# Patient Record
Sex: MALE | Race: WHITE | Employment: FULL TIME | URBAN - METROPOLITAN AREA
[De-identification: names, ages, dates, MRNs, and addresses within clinical notes are randomized per-mention and may not be internally consistent; named-entity substitution may affect disease eponyms.]

---

## 2020-04-26 ENCOUNTER — APPOINTMENT (OUTPATIENT)
Dept: GENERAL RADIOLOGY | Facility: CLINIC | Age: 65
End: 2020-04-26
Attending: EMERGENCY MEDICINE
Payer: COMMERCIAL

## 2020-04-26 ENCOUNTER — HOSPITAL ENCOUNTER (EMERGENCY)
Facility: CLINIC | Age: 65
Discharge: HOME | End: 2020-04-26
Attending: EMERGENCY MEDICINE | Admitting: EMERGENCY MEDICINE
Payer: COMMERCIAL

## 2020-04-26 VITALS
HEART RATE: 82 BPM | SYSTOLIC BLOOD PRESSURE: 118 MMHG | RESPIRATION RATE: 23 BRPM | WEIGHT: 195 LBS | DIASTOLIC BLOOD PRESSURE: 85 MMHG | TEMPERATURE: 97.9 F | OXYGEN SATURATION: 97 % | HEIGHT: 69 IN | BODY MASS INDEX: 28.88 KG/M2

## 2020-04-26 DIAGNOSIS — R53.1 WEAKNESS: ICD-10-CM

## 2020-04-26 DIAGNOSIS — R09.89 CHEST CONGESTION: ICD-10-CM

## 2020-04-26 LAB
ANION GAP SERPL CALCULATED.3IONS-SCNC: 3 MMOL/L (ref 3–14)
BASOPHILS # BLD AUTO: 0.1 10E9/L (ref 0–0.2)
BASOPHILS NFR BLD AUTO: 0.9 %
BUN SERPL-MCNC: 17 MG/DL (ref 7–30)
CALCIUM SERPL-MCNC: 9.1 MG/DL (ref 8.5–10.1)
CHLORIDE SERPL-SCNC: 109 MMOL/L (ref 94–109)
CO2 SERPL-SCNC: 30 MMOL/L (ref 20–32)
CREAT SERPL-MCNC: 1.03 MG/DL (ref 0.66–1.25)
D DIMER PPP FEU-MCNC: 0.3 UG/ML FEU (ref 0–0.5)
DIFFERENTIAL METHOD BLD: ABNORMAL
EOSINOPHIL # BLD AUTO: 0.8 10E9/L (ref 0–0.7)
EOSINOPHIL NFR BLD AUTO: 15.4 %
ERYTHROCYTE [DISTWIDTH] IN BLOOD BY AUTOMATED COUNT: 12.6 % (ref 10–15)
GFR SERPL CREATININE-BSD FRML MDRD: 77 ML/MIN/{1.73_M2}
GLUCOSE SERPL-MCNC: 93 MG/DL (ref 70–99)
HCT VFR BLD AUTO: 45.1 % (ref 40–53)
HGB BLD-MCNC: 16 G/DL (ref 13.3–17.7)
IMM GRANULOCYTES # BLD: 0 10E9/L (ref 0–0.4)
IMM GRANULOCYTES NFR BLD: 0 %
INTERPRETATION ECG - MUSE: NORMAL
LYMPHOCYTES # BLD AUTO: 1 10E9/L (ref 0.8–5.3)
LYMPHOCYTES NFR BLD AUTO: 18.3 %
MCH RBC QN AUTO: 33.1 PG (ref 26.5–33)
MCHC RBC AUTO-ENTMCNC: 35.5 G/DL (ref 31.5–36.5)
MCV RBC AUTO: 93 FL (ref 78–100)
MONOCYTES # BLD AUTO: 0.4 10E9/L (ref 0–1.3)
MONOCYTES NFR BLD AUTO: 7.1 %
NEUTROPHILS # BLD AUTO: 3.2 10E9/L (ref 1.6–8.3)
NEUTROPHILS NFR BLD AUTO: 58.3 %
NRBC # BLD AUTO: 0 10*3/UL
NRBC BLD AUTO-RTO: 0 /100
PLATELET # BLD AUTO: 230 10E9/L (ref 150–450)
POTASSIUM SERPL-SCNC: 4 MMOL/L (ref 3.4–5.3)
RBC # BLD AUTO: 4.84 10E12/L (ref 4.4–5.9)
SODIUM SERPL-SCNC: 142 MMOL/L (ref 133–144)
TROPONIN I SERPL-MCNC: <0.015 UG/L (ref 0–0.04)
WBC # BLD AUTO: 5.5 10E9/L (ref 4–11)

## 2020-04-26 PROCEDURE — 84484 ASSAY OF TROPONIN QUANT: CPT | Performed by: EMERGENCY MEDICINE

## 2020-04-26 PROCEDURE — 71045 X-RAY EXAM CHEST 1 VIEW: CPT

## 2020-04-26 PROCEDURE — 99285 EMERGENCY DEPT VISIT HI MDM: CPT

## 2020-04-26 PROCEDURE — 80048 BASIC METABOLIC PNL TOTAL CA: CPT | Performed by: EMERGENCY MEDICINE

## 2020-04-26 PROCEDURE — 85025 COMPLETE CBC W/AUTO DIFF WBC: CPT | Performed by: EMERGENCY MEDICINE

## 2020-04-26 PROCEDURE — 93005 ELECTROCARDIOGRAM TRACING: CPT

## 2020-04-26 PROCEDURE — 85379 FIBRIN DEGRADATION QUANT: CPT | Performed by: EMERGENCY MEDICINE

## 2020-04-26 RX ORDER — ALBUTEROL SULFATE 90 UG/1
2 AEROSOL, METERED RESPIRATORY (INHALATION) EVERY 4 HOURS PRN
Qty: 1 INHALER | Refills: 0 | Status: SHIPPED | OUTPATIENT
Start: 2020-04-26

## 2020-04-26 RX ORDER — GEMFIBROZIL 600 MG/1
600 TABLET, FILM COATED ORAL
COMMUNITY

## 2020-04-26 RX ORDER — LISINOPRIL 40 MG/1
20 TABLET ORAL DAILY
COMMUNITY

## 2020-04-26 RX ORDER — TAMSULOSIN HYDROCHLORIDE 0.4 MG/1
0.4 CAPSULE ORAL 2 TIMES DAILY
COMMUNITY

## 2020-04-26 RX ORDER — NIACIN 500 MG
TABLET ORAL
COMMUNITY

## 2020-04-26 ASSESSMENT — MIFFLIN-ST. JEOR: SCORE: 1661.95

## 2020-04-26 NOTE — LETTER
April 26, 2020      To Whom It May Concern:      Rickey Alvarado was seen in our Emergency Department today, 04/26/20. He is clear to return to his normal activities without restrictions.    Sincerely,        Jacob Fine MD

## 2020-04-26 NOTE — ED PROVIDER NOTES
History     Chief Complaint:  Chest Pain      HPI   Rickey Alvarado is a 65 year old male who presents with chest pain.  He states that he has been feeling ill with weakness and chest congestion since about February 29, with symptoms waxing and waning.  Tobacco denies any chronic lung conditions.  He reports an occasional cough.  He was evaluated in [facility] on April 18, and was prescribed a Z-Raheel which he took, and felt perhaps lightly better during that time, before symptoms became worse the last few days.  No history of heart conditions.  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  He has not tried any medications beyond the azithromycin for this.  He denies any known sick contacts.  No leg swelling or orthopnea.  No history of DVT or PE.    Allergies:  No known drug allergies     Medications:    Gemfibrozil   Lisinopril   Niacin    Past Medical History:    The patient does not have any past pertinent medical history.     Past Surgical History:    The patient does not have any pertinent past surgical history.     Family History:    No past pertinent family history.     Social History:  Lives in [place]    Review of Systems   All other systems reviewed and are negative.    Physical Exam     Patient Vitals for the past 24 hrs:   BP Temp Temp src Pulse Heart Rate Resp SpO2 Height Weight   04/26/20 1935 -- -- -- -- 87 23 -- -- --   04/26/20 1930 118/85 -- -- 82 86 15 97 % -- --   04/26/20 1925 (!) 118/92 -- -- 76 85 24 95 % -- --   04/26/20 1922 -- -- -- -- 84 19 97 % -- --   04/26/20 1921 -- -- -- -- 80 10 97 % -- --   04/26/20 1920 -- -- -- -- 85 12 96 % -- --   04/26/20 1919 -- -- -- -- 83 23 100 % -- --   04/26/20 1918 -- -- -- -- 80 8 99 % -- --   04/26/20 1917 -- -- -- -- 86 14 100 % -- --   04/26/20 1916 -- -- -- -- 76 (!) 7 93 % -- --   04/26/20 1915 -- -- -- -- 81 11 99 % -- --   04/26/20 1914 -- -- -- -- 75 9 97 % -- --   04/26/20 1913 -- -- -- -- 81 10 99 % -- --   04/26/20 1912  -- -- -- -- 79 10 98 % -- --   04/26/20 1911 -- -- -- -- 90 11 98 % -- --   04/26/20 1910 -- -- -- -- 85 13 96 % -- --   04/26/20 1909 -- -- -- -- 81 9 99 % -- --   04/26/20 1908 -- -- -- -- 84 12 97 % -- --   04/26/20 1907 -- -- -- -- 85 11 99 % -- --   04/26/20 1906 -- -- -- -- 81 11 99 % -- --   04/26/20 1905 -- -- -- -- 75 11 99 % -- --   04/26/20 1904 -- -- -- -- 84 12 99 % -- --   04/26/20 1903 -- -- -- -- 82 12 98 % -- --   04/26/20 1902 -- -- -- -- 83 8 100 % -- --   04/26/20 1901 -- -- -- -- 77 12 98 % -- --   04/26/20 1900 112/85 -- -- 84 81 12 99 % -- --   04/26/20 1859 -- -- -- -- 80 11 99 % -- --   04/26/20 1858 -- -- -- -- 77 12 97 % -- --   04/26/20 1857 -- -- -- -- 80 10 98 % -- --   04/26/20 1856 -- -- -- -- 83 11 98 % -- --   04/26/20 1855 -- -- -- -- 86 11 98 % -- --   04/26/20 1854 -- -- -- -- 86 11 98 % -- --   04/26/20 1853 -- -- -- -- 92 12 96 % -- --   04/26/20 1852 -- -- -- -- 90 16 96 % -- --   04/26/20 1851 -- -- -- -- 93 17 97 % -- --   04/26/20 1850 -- -- -- -- 85 12 99 % -- --   04/26/20 1849 -- -- -- -- 90 14 97 % -- --   04/26/20 1848 -- -- -- -- 87 16 96 % -- --   04/26/20 1847 -- -- -- -- 90 16 97 % -- --   04/26/20 1846 -- -- -- -- 87 15 98 % -- --   04/26/20 1845 -- -- -- -- 81 10 98 % -- --   04/26/20 1844 -- -- -- -- 80 9 99 % -- --   04/26/20 1843 -- -- -- -- 85 11 98 % -- --   04/26/20 1842 -- -- -- -- 80 9 97 % -- --   04/26/20 1841 -- -- -- -- 84 15 96 % -- --   04/26/20 1840 -- -- -- -- 80 17 95 % -- --   04/26/20 1839 -- -- -- -- 86 (!) 36 95 % -- --   04/26/20 1838 -- -- -- -- 91 9 97 % -- --   04/26/20 1837 -- -- -- -- 92 16 96 % -- --   04/26/20 1836 -- -- -- -- 80 9 97 % -- --   04/26/20 1835 -- -- -- -- 81 (!) 0 95 % -- --   04/26/20 1834 -- -- -- -- 78 13 96 % -- --   04/26/20 1833 -- -- -- -- 77 12 96 % -- --   04/26/20 1832 -- -- -- -- 78 9 96 % -- --   04/26/20 1831 -- -- -- -- 71 10 96 % -- --   04/26/20 1830 103/70 -- -- 71 82 11 95 % -- --   04/26/20 1829 -- --  -- -- 78 11 95 % -- --   04/26/20 1828 -- -- -- -- 78 11 95 % -- --   04/26/20 1827 -- -- -- -- 77 12 95 % -- --   04/26/20 1826 -- -- -- -- 79 10 97 % -- --   04/26/20 1825 -- -- -- -- 77 10 96 % -- --   04/26/20 1824 -- -- -- -- 77 15 96 % -- --   04/26/20 1823 -- -- -- -- 85 13 97 % -- --   04/26/20 1822 -- -- -- -- 90 13 96 % -- --   04/26/20 1821 -- -- -- -- 72 8 97 % -- --   04/26/20 1820 -- -- -- -- 81 11 98 % -- --   04/26/20 1819 -- -- -- -- -- -- 98 % -- --   04/26/20 1818 -- -- -- -- -- -- 98 % -- --   04/26/20 1817 -- -- -- -- -- -- 97 % -- --   04/26/20 1816 -- -- -- -- -- -- 96 % -- --   04/26/20 1815 -- -- -- -- -- -- 98 % -- --   04/26/20 1814 -- -- -- -- -- -- 99 % -- --   04/26/20 1813 -- -- -- -- -- -- 98 % -- --   04/26/20 1812 -- -- -- -- -- -- 97 % -- --   04/26/20 1811 -- -- -- -- -- -- 96 % -- --   04/26/20 1810 -- -- -- -- -- -- 96 % -- --   04/26/20 1809 -- -- -- -- -- -- 97 % -- --   04/26/20 1808 -- -- -- -- -- -- 97 % -- --   04/26/20 1807 -- -- -- -- -- -- 97 % -- --   04/26/20 1806 -- -- -- -- -- -- 96 % -- --   04/26/20 1805 -- -- -- -- -- -- 96 % -- --   04/26/20 1804 -- -- -- -- -- -- 96 % -- --   04/26/20 1803 -- -- -- -- -- -- 98 % -- --   04/26/20 1802 -- -- -- -- -- -- 98 % -- --   04/26/20 1801 -- -- -- -- -- -- 97 % -- --   04/26/20 1800 110/82 -- -- 84 -- -- 97 % -- --   04/26/20 1759 -- -- -- -- -- -- 98 % -- --   04/26/20 1758 -- -- -- -- -- -- 98 % -- --   04/26/20 1757 -- -- -- -- -- -- 98 % -- --   04/26/20 1756 -- -- -- -- -- -- 98 % -- --   04/26/20 1755 -- -- -- -- -- -- 99 % -- --   04/26/20 1754 -- -- -- -- -- -- 99 % -- --   04/26/20 1753 -- -- -- -- -- -- 98 % -- --   04/26/20 1752 -- -- -- -- -- -- 98 % -- --   04/26/20 1751 -- -- -- -- -- -- 99 % -- --   04/26/20 1750 -- -- -- -- -- -- 99 % -- --   04/26/20 1749 -- -- -- -- -- -- 99 % -- --   04/26/20 1748 -- -- -- -- -- -- 98 % -- --   04/26/20 1747 -- -- -- -- -- -- 98 % -- --   04/26/20 1746 -- -- -- -- -- --  "99 % -- --   04/26/20 1745 -- -- -- -- -- -- 98 % -- --   04/26/20 1744 -- -- -- -- -- -- 98 % -- --   04/26/20 1743 -- -- -- -- -- -- 100 % -- --   04/26/20 1742 -- -- -- -- -- -- 99 % -- --   04/26/20 1741 -- -- -- -- -- -- 99 % -- --   04/26/20 1740 -- -- -- -- -- -- 99 % -- --   04/26/20 1739 -- -- -- -- -- -- 99 % -- --   04/26/20 1738 -- -- -- -- -- -- 99 % -- --   04/26/20 1737 -- -- -- -- -- -- 100 % -- --   04/26/20 1736 -- -- -- -- -- -- 99 % -- --   04/26/20 1735 -- -- -- -- -- -- 99 % -- --   04/26/20 1734 -- -- -- -- -- -- 100 % -- --   04/26/20 1733 125/85 97.9  F (36.6  C) Oral -- 80 16 100 % 1.74 m (5' 8.5\") 88.5 kg (195 lb)   04/26/20 1732 -- -- -- -- -- -- 99 % -- --   04/26/20 1730 125/85 -- -- 82 -- -- 100 % -- --   04/26/20 1728 (!) 131/95 -- -- 87 -- -- -- -- --   04/26/20 1726 -- -- -- -- -- -- 100 % -- --     Physical Exam   General: Nontoxic-appearing male sitting upright in room 3  HEENT: no major facial deformity  CV: rate as above, appears well perfused, no LE edema  Resp: normal effort, speaks in full phrases, no cough observed  GI: abdomen not protuberant  MSK: ambulatory  Neuro: clear speech, oriented  Psych: cooperative    Emergency Department Course     ECG:  ECG taken at 1813, ECG read at 1818  Normal sinus rhythm  Inferior infarct, age undetermined  No prior EKG  Rate 89 bpm. MO interval 172 ms. QRS duration 88 ms. QT/QTc 350/425 ms. P-R-T axes 26 22 0.    Imaging:  Radiology findings were communicated with the patient who voiced understanding of the findings.    Chest  XR PORT:  No acute cardiopulmonary disease.       Reading per radiology     Laboratory:  Laboratory findings were communicated with the patient who voiced understanding of the findings.    CBC: WBC 5.5, HGB 16.0,   BMP:  o/w WNL (Creatinine 1.03)  Troponin (Collected 1806): <0.015   D Dimer: 0.3    Procedures    Emergency Department Course:   Nursing notes and vitals reviewed.    1735 I performed an exam of " the patient as documented above.     1743 IV was inserted and blood was drawn for laboratory testing, results above.     I performed electronic chart review in [facility].  The patient was placed on continuous cardiac and pulse ox monitoring.    1834 The patient had a Chest PORT XR while in the emergency department, results above.       1916 I personally reviewed the results with the patient and answered all related questions prior to discharge.    Impression & Plan      Medical Decision Making:  The cause of his presenting symptoms is unconfirmed despite testing as above.  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  Infection clearly considered, and not ruled out, though in the absence of infiltrate on chest x-ray I do not think that additional antibiotics are currently indicated.  His work of breathing is normal.  No evidence of acute coronary syndrome with a negative troponin.  No signs of arrhythmia.  Diagnostic uncertainty was acknowledged to the patient and I recommended outpatient follow-up, returning to care promptly unexpected event of sudden worsening.  He was discharged home after all questions answered to his satisfaction.  Work note also provided at his request, stating that he can proceed with his usual activities, as I do not find any medical indication why this should be restricted at this time.    Diagnosis:    ICD-10-CM    1. Chest congestion  R09.89    2. Weakness  R53.1        Disposition:   The patient is discharged to home.     Scribe Disclosure:  I, Stella Alvarado, am serving as a scribe at 1735 on 4/26/2020 to document services personally performed by Messi Fine MD based on my observations and the provider's statements to me.   EMERGENCY DEPARTMENT    This record was created at least in part using  electronic voice recognition software, so please excuse any typographical errors.    Due to COVID-19 pandemic, patient evaluation may have been performed at least in part using communications technology such as phone or video.    Covid-19  Rickey Alvarado was evaluated during a global COVID-19 pandemic, which necessitated consideration that the patient might be at risk for infection with the SARS-CoV-2 virus that causes COVID-19.   Applicable protocols for evaluation were followed during the patient's care.         Messi Fine MD  04/26/20 2038

## 2020-04-26 NOTE — ED AVS SNAPSHOT
Emergency Department  [address] [place] [address]  Phone:  [phone]  Fax:  [phone]                                    Rickey Alvarado   MRN: [id number]    Department:   Emergency Department   Date of Visit:  4/26/2020           After Visit Summary Signature Page    I have received my discharge instructions, and my questions have been answered. I have discussed any challenges I see with this plan with the nurse or doctor.    ..........................................................................................................................................  Patient/Patient Representative Signature      ..........................................................................................................................................  Patient Representative Print Name and Relationship to Patient    ..................................................               ................................................  Date                                   Time    ..........................................................................................................................................  Reviewed by Signature/Title    ...................................................              ..............................................  Date                                               Time          22EPIC Rev 08/18

## 2020-04-27 NOTE — ED TRIAGE NOTES
Pt stated that he started feeling congestion in his chest as of February 29/2020, then slowly progressed to now that he feeling yesterday and today, but he also states he was moving furniture yesterday and he might have over done it. Pt states he was prescribed April 18/20 and states he should be feeling better but isn't feeling better.